# Patient Record
Sex: MALE | Race: BLACK OR AFRICAN AMERICAN | Employment: FULL TIME | URBAN - METROPOLITAN AREA
[De-identification: names, ages, dates, MRNs, and addresses within clinical notes are randomized per-mention and may not be internally consistent; named-entity substitution may affect disease eponyms.]

---

## 2017-08-03 ENCOUNTER — HOSPITAL ENCOUNTER (EMERGENCY)
Age: 22
Discharge: HOME | End: 2017-08-03
Attending: EMERGENCY MEDICINE
Payer: COMMERCIAL

## 2017-08-03 VITALS
HEIGHT: 69 IN | DIASTOLIC BLOOD PRESSURE: 60 MMHG | TEMPERATURE: 98.6 F | BODY MASS INDEX: 28.58 KG/M2 | HEART RATE: 99 BPM | OXYGEN SATURATION: 99 % | SYSTOLIC BLOOD PRESSURE: 115 MMHG | WEIGHT: 193 LBS | RESPIRATION RATE: 16 BRPM

## 2017-08-03 DIAGNOSIS — N48.22 PENILE CELLULITIS: Primary | ICD-10-CM

## 2017-08-03 LAB
APPEARANCE UR: CLEAR
BILIRUB UR QL: NEGATIVE
COLOR UR: NORMAL
GLUCOSE UR STRIP.AUTO-MCNC: NEGATIVE MG/DL
HGB UR QL STRIP: NEGATIVE
KETONES UR QL STRIP.AUTO: NEGATIVE MG/DL
LEUKOCYTE ESTERASE UR QL STRIP.AUTO: NEGATIVE
NITRITE UR QL STRIP.AUTO: NEGATIVE
PH UR STRIP: 7.5 [PH] (ref 5–8)
PROT UR STRIP-MCNC: NEGATIVE MG/DL
RPR SER QL: NONREACTIVE
SP GR UR REFRACTOMETRY: 1.01 (ref 1–1.03)
UROBILINOGEN UR QL STRIP.AUTO: 0.2 EU/DL (ref 0.2–1)

## 2017-08-03 PROCEDURE — 74011250637 HC RX REV CODE- 250/637: Performed by: PHYSICIAN ASSISTANT

## 2017-08-03 PROCEDURE — 96372 THER/PROPH/DIAG INJ SC/IM: CPT

## 2017-08-03 PROCEDURE — 74011250636 HC RX REV CODE- 250/636: Performed by: PHYSICIAN ASSISTANT

## 2017-08-03 PROCEDURE — 86592 SYPHILIS TEST NON-TREP QUAL: CPT | Performed by: PHYSICIAN ASSISTANT

## 2017-08-03 PROCEDURE — 36415 COLL VENOUS BLD VENIPUNCTURE: CPT | Performed by: PHYSICIAN ASSISTANT

## 2017-08-03 PROCEDURE — 87491 CHLMYD TRACH DNA AMP PROBE: CPT | Performed by: PHYSICIAN ASSISTANT

## 2017-08-03 PROCEDURE — 99283 EMERGENCY DEPT VISIT LOW MDM: CPT

## 2017-08-03 PROCEDURE — 74011000250 HC RX REV CODE- 250: Performed by: PHYSICIAN ASSISTANT

## 2017-08-03 PROCEDURE — 81003 URINALYSIS AUTO W/O SCOPE: CPT | Performed by: PHYSICIAN ASSISTANT

## 2017-08-03 RX ORDER — IBUPROFEN 600 MG/1
600 TABLET ORAL
Status: COMPLETED | OUTPATIENT
Start: 2017-08-03 | End: 2017-08-03

## 2017-08-03 RX ORDER — DOXYCYCLINE HYCLATE 100 MG
100 TABLET ORAL 2 TIMES DAILY
Qty: 14 TAB | Refills: 0 | Status: SHIPPED | OUTPATIENT
Start: 2017-08-03 | End: 2017-08-17

## 2017-08-03 RX ORDER — AZITHROMYCIN 250 MG/1
1000 TABLET, FILM COATED ORAL
Status: COMPLETED | OUTPATIENT
Start: 2017-08-03 | End: 2017-08-03

## 2017-08-03 RX ORDER — ACETAMINOPHEN 325 MG/1
650 TABLET ORAL
Status: COMPLETED | OUTPATIENT
Start: 2017-08-03 | End: 2017-08-03

## 2017-08-03 RX ADMIN — IBUPROFEN 600 MG: 600 TABLET, FILM COATED ORAL at 10:06

## 2017-08-03 RX ADMIN — CEFTRIAXONE SODIUM 1 G: 1 INJECTION, POWDER, FOR SOLUTION INTRAMUSCULAR; INTRAVENOUS at 10:06

## 2017-08-03 RX ADMIN — AZITHROMYCIN 1000 MG: 250 TABLET, FILM COATED ORAL at 10:06

## 2017-08-03 RX ADMIN — ACETAMINOPHEN 650 MG: 325 TABLET ORAL at 10:06

## 2017-08-03 NOTE — DISCHARGE INSTRUCTIONS
Penis Pain: Care Instructions  Your Care Instructions  There can be different types of pain in a penis. For example, your penis may be red or sore. Or you may notice pain along with a rash or an unusual discharge. Sometimes men feel pain when they urinate or in their testicles. Penis pain has many causes. For example, the penis can be injured during sports or a fall. Strenuous sex or long periods of sexual activity can also cause pain. In some cases, the pain is caused by an infection like a urinary tract infection (UTI) or a sexually transmitted infection (STI). In other cases, the pain is caused by another health problem such as Peyronie's disease. And sometimes doctors can't find a cause. Your doctor will first do a physical exam of the penis and possibly the rectum and testicles. Your doctor may also do other tests, such as a urine test or an ultrasound. Your treatment depends on the cause of the pain, if known. Follow-up care is a key part of your treatment and safety. Be sure to make and go to all appointments, and call your doctor if you are having problems. It's also a good idea to know your test results and keep a list of the medicines you take. How can you care for yourself at home? · Rest until you feel better. Don't do anything that may cause pain or soreness. · If the pain is the result of an injury, put ice or a cold pack on the area for 10 to 20 minutes at a time. Put a thin cloth between the ice and your skin. And wear jockey shorts, not boxers, for extra support. Some men find that wearing a jock strap helps to relieve pain. · Take an over-the-counter pain medicine, such as acetaminophen (Tylenol), ibuprofen (Advil, Motrin), or naproxen (Aleve). Be safe with medicines. Read and follow all instructions on the label. · If your doctor prescribed antibiotics, take them as directed. Do not stop taking them just because you feel better. You need to take the full course of antibiotics.   When should you call for help? Call your doctor now or seek immediate medical care if:  · You have severe pain. · Your pain gets worse. · You have new symptoms, such as a rash or swelling. · You have symptoms of a urinary tract infection. These may include:  ¨ Pain or burning when you urinate. ¨ A frequent need to urinate without being able to pass much urine. ¨ Pain in the flank, which is just below the rib cage and above the waist on either side of the back. ¨ Blood in your urine. ¨ A fever. Watch closely for changes in your health, and be sure to contact your doctor if:  · You do not get better as expected. Where can you learn more? Go to http://vish-twyla.info/. Enter P904 in the search box to learn more about \"Penis Pain: Care Instructions. \"  Current as of: March 14, 2017  Content Version: 11.3  © 3537-8883 [employer]. Care instructions adapted under license by [employer] (which disclaims liability or warranty for this information). If you have questions about a medical condition or this instruction, always ask your healthcare professional. [place] [address] any warranty or liability for your use of this information. We hope that we have addressed all of your medical concerns. The examination and treatment you received in the Emergency Department were for an emergent problem and were not intended as complete care. It is important that you follow up with your healthcare provider(s) for ongoing care. If your symptoms worsen or do not improve as expected, and you are unable to reach your usual health care provider(s), you should return to the Emergency Department. Today's healthcare is undergoing tremendous change, and patient satisfaction surveys are one of the many tools to assess the quality of medical care. You may receive a survey from the [employer] regarding your experience in the Emergency Department.   I hope that your experience has been completely positive, particularly the medical care that I provided. As such, please participate in the survey; anything less than excellent does not meet my expectations or intentions. [facility] and [employer] participate in nationally recognized quality of care measures. If your blood pressure is greater than 120/80, as reported below, we urge that you seek medical care to address the potential of high blood pressure, commonly known as hypertension. Hypertension can be hereditary or can be caused by certain medical conditions, pain, stress, or \"white coat syndrome. \"       Please make an appointment with your health care provider(s) for follow up of your Emergency Department visit. VITALS:   Patient Vitals for the past 8 hrs:   Temp Pulse Resp BP SpO2   08/03/17 0942 100 °F (37.8 °C) 98 18 135/64 97 %          Thank you for allowing us to provide you with medical care today. We realize that you have many choices for your emergency care needs. Please choose us in the future for any continued health care needs. Marybel Singh, [address] [place] [address].   Office: [phone]            Recent Results (from the past 24 hour(s))   URINALYSIS W/ RFLX MICROSCOPIC    Collection Time: 08/03/17 10:35 AM   Result Value Ref Range    Color STRAW      Appearance CLEAR CLEAR      Specific gravity 1.010 1.003 - 1.030      pH (UA) 7.5 5.0 - 8.0      Protein NEGATIVE  NEG mg/dL    Glucose NEGATIVE  NEG mg/dL    Ketone NEGATIVE  NEG mg/dL    Bilirubin NEGATIVE  NEG      Blood NEGATIVE  NEG      Urobilinogen 0.2 0.2 - 1.0 EU/dL    Nitrites NEGATIVE  NEG      Leukocyte Esterase NEGATIVE  NEG         No results found.

## 2017-08-03 NOTE — LETTER
[facility] EMERGENCY DEPT 
[address] [facility] [address] [id number] 
[phone] Work/School Note Date: 8/3/2017 To Whom It May concern: 
 
Ahmet Casper was seen and treated today in the emergency room by the following provider(s): 
Attending Provider: Patti Carlson MD 
Physician Assistant: Rama Romberg, PA-C. Ahmet Casper may return to work on 08/05/17 but no PT for 5 days (run, ruck, jump, squats, heavy lifting, abdominal exercises). May do limited isometric lifting as tolerated. Sincerely, Rama Romberg, PA-C

## 2017-08-03 NOTE — ED TRIAGE NOTES
Pt ambulatory to treatment area with c/o \"yesterday my left upper stomach was hurting and it seems to have worked it's way down to my penis, it seems a little swollen. \"  Pt states \"it hurts a little to pee. \"  Pt denies nausea, vomiting, diarrhea, fevers. Pt denies taking medication for pain.

## 2017-08-03 NOTE — ED PROVIDER NOTES
HPI Comments: 19 yom pw new onset penile pain/swelling. Reports pain and some swelling along the underside of the penis just proximal to the glans. He is circumcised. Reports one primary partner but has had other partners, most recently 6 weeks ago. Some dysuria and nausea but no fever, discharge, testicular pain or swelling. He is otherwise healthy. Patient is a 19 y.o. male presenting with penile problem. The history is provided by the patient. Penis Pain   This is a new problem. The current episode started yesterday. The problem occurs constantly. The problem has not changed since onset. Primary symptoms include dysuria and penile pain. Pertinent negatives include no genital itching, no genital lesions, no genital rash, no penile discharge, no testicular mass, no swelling, no scrotal pain, no priapism and no inability to urinate. Associated symptoms include nausea. Pertinent negatives include no anorexia, no vomiting, no abdominal pain, no abdominal swelling, no frequency, no constipation, no diarrhea and no flank pain. There has been no fever. Sexual activity: sexually active and multiple partners. He inconsistently uses condoms. Patient has had no prior STD. Partner displays symptoms of an STD: no.        History reviewed. No pertinent past medical history. History reviewed. No pertinent surgical history. History reviewed. No pertinent family history. Social History     Social History    Marital status: SINGLE     Spouse name: N/A    Number of children: N/A    Years of education: N/A     Occupational History    Not on file. Social History Main Topics    Smoking status: Never Smoker    Smokeless tobacco: Never Used    Alcohol use Yes      Comment: occassionally    Drug use: No    Sexual activity: Not on file     Other Topics Concern    Not on file     Social History Narrative         ALLERGIES: Review of patient's allergies indicates no known allergies.     Review of Systems Constitutional: Negative for chills and fever. HENT: Negative for facial swelling and sore throat. Eyes: Negative for photophobia. Respiratory: Negative for cough. Gastrointestinal: Positive for nausea. Negative for abdominal pain, anorexia, constipation, diarrhea, rectal pain and vomiting. Genitourinary: Positive for dysuria, penile pain and penile swelling. Negative for decreased urine volume, difficulty urinating, discharge, flank pain, frequency, genital sores, hematuria, penile discharge, scrotal swelling, testicular pain and urgency. Musculoskeletal: Negative for arthralgias and joint swelling. Skin: Negative for rash. Allergic/Immunologic: Negative for immunocompromised state. Neurological: Negative for headaches. Hematological: Positive for adenopathy. Psychiatric/Behavioral: Negative for confusion. Vitals:    08/03/17 0942   BP: 135/64   Pulse: 98   Resp: 18   Temp: 100 °F (37.8 °C)   SpO2: 97%   Weight: 87.5 kg (193 lb)   Height: 5' 9\" (1.753 m)            Physical Exam   Constitutional: He is oriented to person, place, and time. He appears well-developed and well-nourished. No distress. HENT:   Head: Normocephalic and atraumatic. Mouth/Throat: Oropharynx is clear and moist. No oropharyngeal exudate. Eyes: Conjunctivae are normal. Pupils are equal, round, and reactive to light. No scleral icterus. Neck: Normal range of motion. Neck supple. Cardiovascular: Normal rate, regular rhythm, normal heart sounds and intact distal pulses. No murmur heard. Pulmonary/Chest: Effort normal and breath sounds normal. No respiratory distress. Abdominal: Soft. He exhibits no distension. There is no tenderness. There is no rebound and no guarding. Genitourinary: Testes normal. No swelling in the scrotum or testes. Cremasteric reflex is present. Right testis shows no mass, no swelling and no tenderness. Left testis shows no mass, no swelling and no tenderness. Circumcised. Penile erythema and penile tenderness present. No phimosis, paraphimosis or hypospadias. No discharge found. Genitourinary Comments: There is erythema and mild swelling of the inferior aspect of the penile shaft and skin just proximal to the glans. There are several small papules in the area more c/w pearly papules than infectious process. No overt chancre. No gail urethral discharge. No crepitus, erythema, abscess or necrosis of the scrotum or perineum. Lymphadenopathy:        Right: Inguinal (tender) adenopathy present. Left: No inguinal adenopathy present. Neurological: He is alert and oriented to person, place, and time. Coordination normal.   Skin: Skin is warm and dry. Rash noted. Psychiatric: He has a normal mood and affect. Nursing note and vitals reviewed. MDM  Number of Diagnoses or Management Options  Diagnosis management comments: Stable, well appearing. This is most c/w a cellulitic appearance but early syphilis or HSV could have this appearance although atypically. He has some regional lymphadenopathy as well. Will send RPR and GC/CT labs and cover empirically for GC/CT with azithro/ceftriaxone. Given 1g ceftriaxone here to give broader coverage for staph infections. Rx for doxy which will cover staph, MRSA as well as syphilis in case he is lost to follow-up. The patient was seen, examined, and the chart/vital signs were reviewed in the ER. Appropriate laboratory and imaging studies were obtained based on the patients risk factors, history, and physical exam findings. The results were personally reviewed and interpreted and then reviewed with the patient/caregiver. Patient appears safe for discharge. Diagnosis and treatment plan explained in layman's terms. Counseled need for close f/u with PCP or specialist. Strict return precautions given. Patient verbalized understanding and agreement. All questions answered.       ED Course       Procedures

## 2017-08-03 NOTE — ED NOTES
The patient was discharged home by Dr. Jeanne Eckert and Torin Baker RN in stable condition. The patient is alert and oriented, is in no respiratory distress. The patient's diagnosis, condition and treatment were explained to patient or parent/guardian. The patient/responsible party expressed understanding. 1 prescriptions given to pt. No work/school note given to pt. A discharge plan has been developed. A  was not involved in the process. Aftercare instructions were given to the patient.

## 2017-08-03 NOTE — ED NOTES
Pt medicated as ordered. Pt is unable to urinate at this time. Pt given 2 bottles of water. Call bell within reach.

## 2017-08-04 LAB
C TRACH DNA SPEC QL NAA+PROBE: NEGATIVE
N GONORRHOEA DNA SPEC QL NAA+PROBE: NEGATIVE
SAMPLE TYPE: NORMAL
SERVICE CMNT-IMP: NORMAL
SPECIMEN SOURCE: NORMAL

## 2018-01-13 ENCOUNTER — HOSPITAL ENCOUNTER (EMERGENCY)
Age: 23
Discharge: HOME | End: 2018-01-13
Attending: EMERGENCY MEDICINE
Payer: COMMERCIAL

## 2018-01-13 VITALS
DIASTOLIC BLOOD PRESSURE: 90 MMHG | HEART RATE: 89 BPM | BODY MASS INDEX: 30.3 KG/M2 | WEIGHT: 211.64 LBS | TEMPERATURE: 97.7 F | HEIGHT: 70 IN | SYSTOLIC BLOOD PRESSURE: 128 MMHG | OXYGEN SATURATION: 98 % | RESPIRATION RATE: 16 BRPM

## 2018-01-13 DIAGNOSIS — J06.9 UPPER RESPIRATORY TRACT INFECTION, UNSPECIFIED TYPE: Primary | ICD-10-CM

## 2018-01-13 PROCEDURE — 99282 EMERGENCY DEPT VISIT SF MDM: CPT

## 2018-01-13 NOTE — ED TRIAGE NOTES
Pt presents to ED with c/o four day hx of cough, productive of green sputum,congestion, headache and sore throat. Pt states two episodes of diarrhea yesterday.

## 2018-01-13 NOTE — ED PROVIDER NOTES
HPI Comments: 23 y.o. male with no significant past medical history who presents from home accompanied by significant other with chief complaint of congestion. Patient states that his symptoms started Wednesday with congestion, sore throat, cough, and sinus pressure. Denies fever. Also reports that he had 3 episodes of diarrhea yesterday. Reports that he is coughing up green mucus. Has a dull, achy frontal headache. Has been taking Dayquil with minimal relief. Has been eating and drinking well. There are no other acute medical concerns at this time. Denies fever, vision changes, dizziness, chest pain, shortness of breath, abdominal pain,  symptoms, neck pain, back pain  Social hx: denies smoking, ETOH on occasion  PCP: Jim Go MD        Patient is a 23 y.o. male presenting with cough, sore throat, and headaches. The history is provided by the patient. Cough   Associated symptoms include chills, headaches and sore throat. Pertinent negatives include no chest pain, no eye redness, no ear pain, no shortness of breath, no wheezing, no nausea, no vomiting and no confusion. Sore Throat    Associated symptoms include diarrhea, congestion, headaches and cough. Pertinent negatives include no vomiting, no ear discharge, no ear pain and no shortness of breath. Headache    Pertinent negatives include no fever, no shortness of breath, no dizziness, no nausea and no vomiting. History reviewed. No pertinent past medical history. History reviewed. No pertinent surgical history. History reviewed. No pertinent family history. Social History     Social History    Marital status: SINGLE     Spouse name: N/A    Number of children: N/A    Years of education: N/A     Occupational History    Not on file.      Social History Main Topics    Smoking status: Never Smoker    Smokeless tobacco: Never Used    Alcohol use Yes      Comment: occassionally    Drug use: No    Sexual activity: Not on file Other Topics Concern    Not on file     Social History Narrative         ALLERGIES: Review of patient's allergies indicates no known allergies. Review of Systems   Constitutional: Positive for chills. Negative for appetite change and fever. HENT: Positive for congestion, sinus pressure and sore throat. Negative for ear discharge and ear pain. Eyes: Negative for discharge and redness. Respiratory: Positive for cough. Negative for shortness of breath and wheezing. Cardiovascular: Negative for chest pain. Gastrointestinal: Positive for diarrhea. Negative for abdominal pain, constipation, nausea and vomiting. Genitourinary: Negative for difficulty urinating and dysuria. Musculoskeletal: Negative for back pain and neck pain. Neurological: Positive for headaches. Negative for dizziness. Psychiatric/Behavioral: Negative for confusion and decreased concentration. All other systems reviewed and are negative. Vitals:    01/13/18 1208   BP: 128/90   Pulse: 89   Resp: 16   Temp: 97.7 °F (36.5 °C)   SpO2: 98%   Weight: 96 kg (211 lb 10.3 oz)   Height: 5' 10\" (1.778 m)            Physical Exam   Constitutional: He is oriented to person, place, and time. He appears well-developed and well-nourished. No distress. HENT:   Head: Normocephalic and atraumatic. Nose: Rhinorrhea present. Mouth/Throat: Uvula is midline and mucous membranes are normal. Posterior oropharyngeal erythema present. No oropharyngeal exudate or tonsillar abscesses. Has some eczema and cerumen in bilateral ears making visualization of the TM difficult. Eyes: Conjunctivae are normal. Pupils are equal, round, and reactive to light. Right eye exhibits no discharge. Left eye exhibits no discharge. Neck: Normal range of motion. Neck supple. No thyromegaly present. Cardiovascular: Normal rate, regular rhythm and normal heart sounds. No murmur heard.   Pulmonary/Chest: Effort normal and breath sounds normal. No respiratory distress. He has no wheezes. He has no rales. Abdominal: Soft. Bowel sounds are normal. He exhibits no distension. There is no tenderness. Musculoskeletal: Normal range of motion. He exhibits no edema or deformity. Lymphadenopathy:     He has no cervical adenopathy. Neurological: He is alert and oriented to person, place, and time. Coordination normal.   Skin: Skin is warm and dry. No rash noted. Psychiatric: He has a normal mood and affect. His behavior is normal. Judgment and thought content normal.   Nursing note and vitals reviewed. MDM  Number of Diagnoses or Management Options  Upper respiratory tract infection, unspecified type:   Diagnosis management comments: Physical exam reassuring. Patient had symptoms x 3 days, likely a viral URI. Also consider influenza, but patient is afebrile, out of tamiflu window and has no underlying chronic conditions  Will not likely benefit from antibiotics at this time . Vitals stable, in no distress and not toxic  Plan to discharge with symptomatic care. Instructed to follow-up with PCP and return for any concerning symptoms     ED Course   Attending Latoya Bowman MD in agreement with the plan of care  Radha Bañuelos NP    12:37 PM  Went over discharge plan with patient including the nature of upper respiratory infections, that most are caused by viruses and do not benefit from antibiotics. Went over symptomatic care, OTC treatments, and when to seek medical care for symptoms. Instructed patient to return to ER for concerning symptoms. He verbalized understanding.    Radha Bañuelos NP    Procedures

## 2018-01-13 NOTE — LETTER
[facility] EMERGENCY DEPT 
[address] [place] [address] [address] [id number] 
[phone] Work/School Note Date: 1/13/2018 To Whom It May concern: 
 
Hola Anand was seen and treated today in the emergency room by the following provider(s): 
Attending Provider: Lyndon Mohs, MD 
Nurse Practitioner: Jamison Alberts NP. Hola Anand may return to work on 1/14/2018 as long as you do not have a fever.  
 
Sincerely, 
 
 
 
 
Jamison Alberts NP

## 2018-08-06 ENCOUNTER — HOSPITAL ENCOUNTER (EMERGENCY)
Age: 23
Discharge: HOME | End: 2018-08-06
Attending: EMERGENCY MEDICINE
Payer: COMMERCIAL

## 2018-08-06 VITALS
BODY MASS INDEX: 28.77 KG/M2 | OXYGEN SATURATION: 97 % | SYSTOLIC BLOOD PRESSURE: 121 MMHG | DIASTOLIC BLOOD PRESSURE: 76 MMHG | RESPIRATION RATE: 16 BRPM | HEART RATE: 72 BPM | WEIGHT: 201 LBS | HEIGHT: 70 IN | TEMPERATURE: 97.5 F

## 2018-08-06 DIAGNOSIS — J30.2 SEASONAL ALLERGIC RHINITIS, UNSPECIFIED TRIGGER: Primary | ICD-10-CM

## 2018-08-06 DIAGNOSIS — R51.9 ACUTE NONINTRACTABLE HEADACHE, UNSPECIFIED HEADACHE TYPE: ICD-10-CM

## 2018-08-06 PROCEDURE — 99282 EMERGENCY DEPT VISIT SF MDM: CPT

## 2018-08-06 RX ORDER — LORATADINE AND PSEUDOEPHEDRINE 10; 240 MG/1; MG/1
1 TABLET, EXTENDED RELEASE ORAL DAILY
Qty: 15 TAB | Refills: 0 | Status: SHIPPED | OUTPATIENT
Start: 2018-08-06 | End: 2018-08-21

## 2018-08-06 NOTE — ED NOTES
The patient was discharged home by Dat Barnett NP in stable condition. The patient is alert and oriented, in no respiratory distress. The patient's diagnosis, condition and treatment were explained. The patient expressed understanding. One prescriptions given. No work/school note given. A discharge plan has been developed. A  was not involved in the process. Aftercare instructions were given. Pt ambulatory out of the ED.

## 2018-08-06 NOTE — ED PROVIDER NOTES
HPI Comments: Patient is a 49-year-old male with a past medical history significant for eczema in bilateral ear canals who is ambulatory to the ED today with complaints of headache, sweats, chills. Patient states over the last few days he has felt unwell. He complains of sweating while in the air-conditioning yesterday along with sneezing, mildly sore throat and \"heavy breathing\". He states his girlfriend noticed the heavy breathing yesterday and took a video 10 breathing. Patient states prior to reviewing the video he did not note that he was breathing hard. Today he states he woke with a headache with a mildly sore throat and continued sneezing. Patient denies any fevers, chills, nausea, vomiting, diarrhea, constipation, ear pain, nasal discharge, photophobia, dizziness, sensitivity to sound, itchy eyes or discharge from the eyes. Patient states the headache is on the left side of his head in its mild and describes it as dull and achy. Patient denies daily medications except in the fall when he takes Claritin for allergies and has no allergies to any medications. Patient has no further complaints at this time. Primary care Gomez Conrad MD      The history is provided by the patient. No  was used. Past Medical History:   Diagnosis Date    Influenza     Mononucleosis        History reviewed. No pertinent surgical history. History reviewed. No pertinent family history. Social History     Social History    Marital status: SINGLE     Spouse name: N/A    Number of children: N/A    Years of education: N/A     Occupational History    Not on file.      Social History Main Topics    Smoking status: Never Smoker    Smokeless tobacco: Never Used    Alcohol use Yes      Comment: occasionally    Drug use: No    Sexual activity: Not on file     Other Topics Concern    Not on file     Social History Narrative         ALLERGIES: Review of patient's allergies indicates no known allergies. Review of Systems   Constitutional: Negative for appetite change, chills and fever. HENT: Positive for sneezing and sore throat. Negative for ear discharge, ear pain, facial swelling, trouble swallowing and voice change. Eyes: Negative for photophobia, pain, discharge, redness, itching and visual disturbance. Respiratory: Positive for cough and shortness of breath. Negative for wheezing. Cardiovascular: Negative for chest pain. Gastrointestinal: Negative for abdominal pain, constipation, diarrhea, nausea and vomiting. Genitourinary: Negative for dysuria and urgency. Musculoskeletal: Negative for back pain. Skin: Negative for color change and rash. Neurological: Positive for headaches. Negative for dizziness. Psychiatric/Behavioral: Negative. All other systems reviewed and are negative. Vitals:    08/06/18 1242   BP: 121/76   Pulse: 72   Resp: 16   Temp: 97.5 °F (36.4 °C)   SpO2: 97%   Weight: 91.2 kg (201 lb)   Height: 5' 10\" (1.778 m)            Physical Exam   Constitutional: He is oriented to person, place, and time. Vital signs are normal. He appears well-developed and well-nourished. Healthy and physically fit male in NAD. HENT:   Head: Normocephalic and atraumatic. Right Ear: Hearing and external ear normal.   Left Ear: Hearing and external ear normal.   Nose: Nose normal. No mucosal edema, rhinorrhea or sinus tenderness. Mouth/Throat: Uvula is midline, oropharynx is clear and moist and mucous membranes are normal.   Eczema flaking in bilateral ear canals. Unable to see TM clearly. Neck: Normal range of motion. Neck supple. Cardiovascular: Normal rate, regular rhythm, normal heart sounds and intact distal pulses. Exam reveals no gallop. No murmur heard. Pulses:       Radial pulses are 2+ on the right side, and 2+ on the left side. Pulmonary/Chest: Effort normal and breath sounds normal. No accessory muscle usage.  No tachypnea and no bradypnea. No respiratory distress. He has no decreased breath sounds. He has no wheezes. He has no rhonchi. He has no rales. He exhibits no tenderness. Abdominal: Soft. Bowel sounds are normal. There is no tenderness. There is no guarding. Musculoskeletal: Normal range of motion. Neurological: He is alert and oriented to person, place, and time. Skin: Skin is warm and dry. No erythema. Psychiatric: He has a normal mood and affect. His behavior is normal. Judgment and thought content normal.   Nursing note and vitals reviewed. [facility]      ED Course     Assessment & Plan:     Orders Placed This Encounter    loratadine-pseudoephedrine (CLARITIN-D 24 HOUR)  mg per tablet     Early onset of fall seasonal allergies likely secondary to rainy weather over the last several weeks in [address] [place] [address] Will start treatment with allergy medication today. Discussed with Radha Rivers MD,ED Provider    Tayo Kimble NP  08/06/18  12:50 PM    1:02 PM  The patient has been reevaluated. The patient is ready for discharge. The patient's signs, symptoms, diagnosis, and discharge instructions have been discussed and the patient/ family has conveyed their understanding. The patient is to follow up as recommended or return to the ED should their symptoms worsen. Plan has been discussed and the patient is in agreement. LABORATORY TESTS:  Labs Reviewed - No data to display    IMAGING RESULTS:  No results found. MEDICATIONS GIVEN:  Medications - No data to display    IMPRESSION:  1. Seasonal allergic rhinitis, unspecified trigger    2. Acute nonintractable headache, unspecified headache type        PLAN:  1. Current Discharge Medication List      START taking these medications    Details   loratadine-pseudoephedrine (CLARITIN-D 24 HOUR)  mg per tablet Take 1 Tab by mouth daily for 15 days.  Then switch to OTC Claritin without decongestant  Indications: SEASONAL ALLERGIC RHINITIS, SNEEZING  Qty: 15 Tab, Refills: 0           2. Follow-up Information     Follow up With Details Comments Contact Info    Nguyen Johnson MD Call As needed [address]      [facility] EM[facility]  As needed, If symptoms worsen [address] [facility]  [phone]        3.      Return to ED for new or worsening symptoms       Tana Treviño NP    Procedures

## 2018-08-06 NOTE — ED TRIAGE NOTES
Patient arrives ambulatory to treatment area with a steady gait. Patient states he experienced chills and sweats yesterday. Today, patient states he has had a headache since awakening. Patient states girlfriend told him that he has also had heavy breathing and shortness of breath in the past day. Patient states he \"had a little cough yesterday\", but no coughing today.

## 2018-08-06 NOTE — DISCHARGE INSTRUCTIONS
Seasonal Allergies: Care Instructions  Your Care Instructions  Allergies occur when your body's defense system (immune system) overreacts to certain substances. The immune system treats a harmless substance as if it were a harmful germ or virus. Many things can cause this to happen. Examples include pollens, medicine, food, dust, animal dander, and mold. Your allergies are seasonal if you have symptoms just at certain times of the year. In that case, you are probably allergic to pollens from certain trees, grasses, or weeds. Allergies can be mild or severe. Over-the-counter allergy medicine may help with some symptoms. Read and follow all instructions on the label. Managing your allergies is an important part of staying healthy. Your doctor may suggest that you have tests to help find the cause of your allergies. When you know what things trigger your symptoms, you can avoid them. This can prevent allergy symptoms and other health problems. In some cases, immunotherapy might help. For this treatment, you get shots or use pills that have a small amount of certain allergens in them. Your body \"gets used to\" the allergen, so you react less to it over time. This kind of treatment may help prevent or reduce some allergy symptoms. Follow-up care is a key part of your treatment and safety. Be sure to make and go to all appointments, and call your doctor if you are having problems. It's also a good idea to know your test results and keep a list of the medicines you take. How can you care for yourself at home? · Be safe with medicines. Take your medicines exactly as prescribed. Call your doctor if you think you are having a problem with your medicine. · During your allergy season, keep windows closed. If you need to use air-conditioning, change or clean all filters every month. Take a shower and change your clothes after you have been outside. · Stay inside when pollen counts are high.  Vacuum once or twice a week. Use a vacuum  with a HEPA filter or a double-thickness filter. When should you call for help? Give an epinephrine shot if:    · You think you are having a severe allergic reaction.    After giving an epinephrine shot, call 911, even if you feel better.   Call 911 if:    · You have symptoms of a severe allergic reaction. These may include:  ¨ Sudden raised, red areas (hives) all over your body. ¨ Swelling of the throat, mouth, lips, or tongue. ¨ Trouble breathing. ¨ Passing out (losing consciousness). Or you may feel very lightheaded or suddenly feel weak, confused, or restless.     · You have been given an epinephrine shot, even if you feel better.    Call your doctor now or seek immediate medical care if:    · You have symptoms of an allergic reaction, such as:  ¨ A rash or hives (raised, red areas on the skin). ¨ Itching. ¨ Swelling. ¨ Belly pain, nausea, or vomiting.    Watch closely for changes in your health, and be sure to contact your doctor if:    · You do not get better as expected. Where can you learn more? Go to http://vish-twyla.info/. Enter J912 in the search box to learn more about \"Seasonal Allergies: Care Instructions. \"  Current as of: October 6, 2017  Content Version: 11.7  © 8086-2563 [employer]. Care instructions adapted under license by [employer] (which disclaims liability or warranty for this information). If you have questions about a medical condition or this instruction, always ask your healthcare professional. [place] [address] any warranty or liability for your use of this information.

## 2021-08-21 NOTE — DISCHARGE INSTRUCTIONS
If your symptoms persist beyond 10 days, you start to have fevers or become acutely worse please return to the ER. Otherwise, follow-up with your primary care provider as needed. Upper Respiratory Infection (Cold): Care Instructions  Your Care Instructions    An upper respiratory infection, or URI, is an infection of the nose, sinuses, or throat. URIs are spread by coughs, sneezes, and direct contact. The common cold is the most frequent kind of URI. The flu and sinus infections are other kinds of URIs. Almost all URIs are caused by viruses. Antibiotics won't cure them. But you can treat most infections with home care. This may include drinking lots of fluids and taking over-the-counter pain medicine. You will probably feel better in 4 to 10 days. The doctor has checked you carefully, but problems can develop later. If you notice any problems or new symptoms, get medical treatment right away. Follow-up care is a key part of your treatment and safety. Be sure to make and go to all appointments, and call your doctor if you are having problems. It's also a good idea to know your test results and keep a list of the medicines you take. How can you care for yourself at home? · To prevent dehydration, drink plenty of fluids, enough so that your urine is light yellow or clear like water. Choose water and other caffeine-free clear liquids until you feel better. If you have kidney, heart, or liver disease and have to limit fluids, talk with your doctor before you increase the amount of fluids you drink. · Take an over-the-counter pain medicine, such as acetaminophen (Tylenol), ibuprofen (Advil, Motrin), or naproxen (Aleve). Read and follow all instructions on the label. · Before you use cough and cold medicines, check the label. These medicines may not be safe for young children or for people with certain health problems.   · Be careful when taking over-the-counter cold or flu medicines and Tylenol at the same time. Many of these medicines have acetaminophen, which is Tylenol. Read the labels to make sure that you are not taking more than the recommended dose. Too much acetaminophen (Tylenol) can be harmful. · Get plenty of rest.  · Do not smoke or allow others to smoke around you. If you need help quitting, talk to your doctor about stop-smoking programs and medicines. These can increase your chances of quitting for good. When should you call for help? Call 911 anytime you think you may need emergency care. For example, call if:  ? · You have severe trouble breathing. ?Call your doctor now or seek immediate medical care if:  ? · You seem to be getting much sicker. ? · You have new or worse trouble breathing. ? · You have a new or higher fever. ? · You have a new rash. ? Watch closely for changes in your health, and be sure to contact your doctor if:  ? · You have a new symptom, such as a sore throat, an earache, or sinus pain. ? · You cough more deeply or more often, especially if you notice more mucus or a change in the color of your mucus. ? · You do not get better as expected. Where can you learn more? Go to http://vish-twyla.info/. Enter Z567 in the search box to learn more about \"Upper Respiratory Infection (Cold): Care Instructions. \"  Current as of: May 12, 2017  Content Version: 11.4  © 5204-7597 [employer]. Care instructions adapted under license by [employer] (which disclaims liability or warranty for this information). If you have questions about a medical condition or this instruction, always ask your healthcare professional. [place] [address] any warranty or liability for your use of this information. Saline Nasal Washes for Children: Care Instructions  Your Care Instructions  Your doctor may suggest that you use salt water (saline) to wash mucus from your child's nose and sinuses.  This simple remedy can help relieve symptoms of allergies, sinusitis, and colds. Most children notice a little burning sensation in the nose the first few times the solution is used, but this usually gets better in a few days. Follow-up care is a key part of your child's treatment and safety. Be sure to make and go to all appointments, and call your doctor if your child is having problems. It's also a good idea to know your child's test results and keep a list of the medicines your child takes. How can you care for your child at home? · You can buy premixed saline solution in a squeeze bottle at a drugstore. Read and follow the instructions on the label. · You can make your own saline solution at home by adding 1 teaspoon of salt and 1 teaspoon of baking soda to 2 cups of distilled water. · If you use a homemade solution, pour a small amount into a clean bowl. Using a rubber bulb syringe, squeeze the syringe and place the tip in the salt water. Draw a small amount into the syringe by relaxing your hand. · Have your child sit down with his or her head tilted slightly back. Do not have your child lie down. Put the tip of the bulb syringe or squeeze bottle a little way into one of your child's nostrils. Gently drip or squirt a few drops into the nostril. Repeat with the other nostril. Some sneezing and gagging are normal at first.  · Have your child blow his or her nose. If your child is too young to blow, gently suction the nostrils with the bulb syringe. · Wipe the syringe or bottle tip clean after each use. · Repeat this 2 or 3 times a day. · Use nasal washes gently in children who have frequent nosebleeds. When should you call for help? Watch closely for changes in your child's health, and be sure to contact your doctor if your child has any problems. Where can you learn more? Go to http://vish-twyla.info/.   Enter A372 in the search box to learn more about \"Saline Nasal Washes for Children: Care Instructions. \"  Current as of: May 12, 2017  Content Version: 11.4  © 0320-2503 [employer], [facility]. Care instructions adapted under license by [employer] (which disclaims liability or warranty for this information). If you have questions about a medical condition or this instruction, always ask your healthcare professional. [place] [address] any warranty or liability for your use of this information. No